# Patient Record
(demographics unavailable — no encounter records)

---

## 2024-11-11 NOTE — HISTORY OF PRESENT ILLNESS
[FreeTextEntry1] : 46-year-old male with long history of diabetes mellitus exercising by walking 10,000 steps a day.  There is no significant weight gain.  Elastography was between F0 and F1 and his fib 4 was calculated at 1.13.  His father passed away of metastatic lung cancer in February.  The patient is trying to make healthy choices with food and continues with his insulin pump.  Gained 4 lbs. had covid in September. Eating carbs.  RTO 5/9/24- States a1c 7.2%. Walking [id number] steps. Gained 5 lbs. MELÉNDEZ fibroscore s2-3 F0. Now on statin No etoh .  RTO 11/12/24 lost job recently. NO weight loss, Hx of NAFLS with previous elastography with F0-f1. [de-identified] : 04/2022 F0-1

## 2024-11-11 NOTE — PHYSICAL EXAM

## 2025-05-14 NOTE — HISTORY OF PRESENT ILLNESS
[FreeTextEntry1] : 46-year-old male with long history of diabetes mellitus exercising by walking 10,000 steps a day.  There is no significant weight gain.  Elastography was between F0 and F1 and his fib 4 was calculated at 1.13.  His father passed away of metastatic lung cancer in February.  The patient is trying to make healthy choices with food and continues with his insulin pump.  Gained 4 lbs. had covid in September. Eating carbs.  RTO 5/9/24- States a1c 7.2%. Walking [id number] steps. Gained 5 lbs. MELÉNDEZ fibroscore s2-3 F0. Now on statin No etoh .  RTO 11/12/24 lost job recently. NO weight loss, Hx of NAFLS with previous elastography with F0-f1.  RTO 5/14/25 for 6 month follow up of elevated LFT's. Patient is feeling well, no complaints. Patient recalls constipation due to giving up caffeine for lent. BMs have returned to normal. Hx DM1 on insulin pump. Denies sob, cp, abdominal pain or altered BMs at this time. Discussed colon cancer screening, wants to defer at this time. Denies family hx colon cancer. Will order repeat lab work and follow up results.    [de-identified] : 04/2022 F0-1

## 2025-05-14 NOTE — ASSESSMENT
[FreeTextEntry1] : 46-year-old male with long history of diabetes mellitus exercising by walking 10,000 steps a day.  There is no significant weight gain.  Elastography was between F0 and F1 and his fib 4 was calculated at 1.13.  His father passed away of metastatic lung cancer in February.  The patient is trying to make healthy choices with food and continues with his insulin pump.  Gained 4 lbs. had covid in September. Eating carbs.  RTO 5/9/24- States a1c 7.2%. Walking [id number] steps. Gained 5 lbs. MELÉNDEZ fibroscore s2-3 F0. Now on statin No etoh .  RTO 11/12/24 lost job recently. NO weight loss, Hx of NAFLS with previous elastography with F0-f1.  RTO 5/14/25 for 6 month follow up of elevated LFT's. Patient is feeling well, no complaints. Patient recalls constipation due to giving up caffeine for lent. BMs have returned to normal. Hx DM1 on insulin pump. Denies sob, cp, abdominal pain or altered BMs at this time. Discussed colon cancer screening, wants to defer at this time. Denies family hx colon cancer. Will order repeat lab work and follow up results.   Plan: 1. Ordered repeat lab work 2. Follow up results with NP 2-3 days.

## 2025-05-14 NOTE — PHYSICAL EXAM

## 2025-05-14 NOTE — REASON FOR VISIT
[Follow-up] : a follow-up of an existing diagnosis [FreeTextEntry1] : 6 months follow up elevated LFT's